# Patient Record
Sex: FEMALE | Race: BLACK OR AFRICAN AMERICAN | URBAN - METROPOLITAN AREA
[De-identification: names, ages, dates, MRNs, and addresses within clinical notes are randomized per-mention and may not be internally consistent; named-entity substitution may affect disease eponyms.]

---

## 2023-02-15 ENCOUNTER — OFFICE (OUTPATIENT)
Dept: URBAN - METROPOLITAN AREA CLINIC 35 | Facility: CLINIC | Age: 63
Setting detail: OPHTHALMOLOGY
End: 2023-02-15
Payer: COMMERCIAL

## 2023-02-15 DIAGNOSIS — D31.02: ICD-10-CM

## 2023-02-15 DIAGNOSIS — H25.13: ICD-10-CM

## 2023-02-15 DIAGNOSIS — H40.1131: ICD-10-CM

## 2023-02-15 DIAGNOSIS — D31.01: ICD-10-CM

## 2023-02-15 DIAGNOSIS — H02.831: ICD-10-CM

## 2023-02-15 PROCEDURE — 92012 INTRM OPH EXAM EST PATIENT: CPT | Performed by: OPHTHALMOLOGY

## 2023-02-15 PROCEDURE — 92083 EXTENDED VISUAL FIELD XM: CPT | Performed by: OPHTHALMOLOGY

## 2023-02-15 ASSESSMENT — REFRACTION_MANIFEST
OS_AXIS: 047
OD_VA1: 20/20-2
OD_CYLINDER: +1.00
OD_VA1: 20/40+1
OD_SPHERE: +0.75
OS_VA1: 20/30+2
OS_SPHERE: PLANO
OD_SPHERE: PLANO
OD_CYLINDER: 0.00
OS_VA1: 20/20-3
OD_AXIS: 000
OD_AXIS: 005
OS_SPHERE: -1.00
OS_CYLINDER: -1.25
OS_CYLINDER: SPHERE

## 2023-02-15 ASSESSMENT — REFRACTION_AUTOREFRACTION
OD_SPHERE: +1.00
OD_CYLINDER: -2.00
OS_SPHERE: -0.50
OS_CYLINDER: -1.25
OS_AXIS: 050
OD_AXIS: 093

## 2023-02-15 ASSESSMENT — LID POSITION - DERMATOCHALASIS
OD_DERMATOCHALASIS: RUL 1+
OS_DERMATOCHALASIS: LUL 1+

## 2023-02-15 ASSESSMENT — AXIALLENGTH_DERIVED
OD_AL: 25.003
OS_AL: 33.74
OD_AL: 24.6791

## 2023-02-15 ASSESSMENT — PACHYMETRY
OD_CT_CORRECTION: -5
OD_CT_UM: 617
OS_CT_UM: 618
OS_CT_CORRECTION: -5

## 2023-02-15 ASSESSMENT — VISUAL ACUITY
OD_BCVA: 20/30-2
OS_BCVA: 20/50-2

## 2023-02-15 ASSESSMENT — KERATOMETRY
OD_K1POWER_DIOPTERS: 39.50
OD_K2POWER_DIOPTERS: 40.25
OS_AXISANGLE_DEGREES: 134
OS_K1POWER_DIOPTERS: 40.00
OS_K2POWER_DIOPTERS: 10.75
OD_AXISANGLE_DEGREES: 171

## 2023-02-15 ASSESSMENT — TONOMETRY
OS_IOP_MMHG: 17
OD_IOP_MMHG: 17

## 2023-02-15 ASSESSMENT — LID EXAM ASSESSMENTS
OS_MEIBOMITIS: LLL 1+
OD_MEIBOMITIS: RLL 1+

## 2023-02-15 ASSESSMENT — SPHEQUIV_DERIVED
OD_SPHEQUIV: 0
OD_SPHEQUIV: 0.75
OS_SPHEQUIV: -1.125

## 2023-06-14 ENCOUNTER — OFFICE (OUTPATIENT)
Dept: URBAN - METROPOLITAN AREA CLINIC 35 | Facility: CLINIC | Age: 63
Setting detail: OPHTHALMOLOGY
End: 2023-06-14
Payer: COMMERCIAL

## 2023-06-14 DIAGNOSIS — H02.831: ICD-10-CM

## 2023-06-14 DIAGNOSIS — H25.13: ICD-10-CM

## 2023-06-14 DIAGNOSIS — E11.9: ICD-10-CM

## 2023-06-14 DIAGNOSIS — D31.02: ICD-10-CM

## 2023-06-14 DIAGNOSIS — D31.01: ICD-10-CM

## 2023-06-14 PROBLEM — H52.223 ASTIGMATISM, REGULAR; BOTH EYES: Status: ACTIVE | Noted: 2023-06-14

## 2023-06-14 PROCEDURE — 92014 COMPRE OPH EXAM EST PT 1/>: CPT | Performed by: OPHTHALMOLOGY

## 2023-06-14 PROCEDURE — 92250 FUNDUS PHOTOGRAPHY W/I&R: CPT | Performed by: OPHTHALMOLOGY

## 2023-06-14 ASSESSMENT — REFRACTION_MANIFEST
OS_CYLINDER: SPHERE
OS_VA1: 20/20-3
OS_SPHERE: PLANO
OD_SPHERE: +0.75
OS_VA1: 20/40
OD_CYLINDER: +1.00
OD_AXIS: 100
OD_VA1: 20/50
OD_CYLINDER: 0.00
OS_AXIS: 047
OD_AXIS: 000
OS_SPHERE: PLANO
OD_VA1: 20/20-2
OS_CYLINDER: -1.25
OD_SPHERE: +2.00
OD_CYLINDER: -2.50
OS_SPHERE: -1.00
OD_SPHERE: PLANO
OD_VA1: 20/40+1
OS_AXIS: 045
OS_VA1: 20/30+2
OD_AXIS: 005
OS_CYLINDER: -1.00

## 2023-06-14 ASSESSMENT — REFRACTION_AUTOREFRACTION
OS_SPHERE: 0.00
OS_CYLINDER: -1.00
OD_CYLINDER: -2.50
OD_AXIS: 099
OD_SPHERE: +2.00
OS_AXIS: 043

## 2023-06-14 ASSESSMENT — VISUAL ACUITY
OS_BCVA: 20/60
OD_BCVA: 20/40

## 2023-06-14 ASSESSMENT — LID EXAM ASSESSMENTS
OS_MEIBOMITIS: LLL 1+
OD_MEIBOMITIS: RLL 1+

## 2023-06-14 ASSESSMENT — AXIALLENGTH_DERIVED
OS_AL: 25.1192
OD_AL: 24.78

## 2023-06-14 ASSESSMENT — PACHYMETRY
OD_CT_CORRECTION: -5
OS_CT_UM: 618
OS_CT_CORRECTION: -5
OD_CT_UM: 617

## 2023-06-14 ASSESSMENT — KERATOMETRY
OS_K2POWER_DIOPTERS: 40.75
OS_AXISANGLE_DEGREES: 129
OD_K2POWER_DIOPTERS: 40.25
OD_K1POWER_DIOPTERS: 39.00
OS_K1POWER_DIOPTERS: 39.50
OD_AXISANGLE_DEGREES: 164

## 2023-06-14 ASSESSMENT — SPHEQUIV_DERIVED
OD_SPHEQUIV: 0.75
OS_SPHEQUIV: -0.5
OD_SPHEQUIV: 0.75
OD_SPHEQUIV: 0.75

## 2023-06-14 ASSESSMENT — CONFRONTATIONAL VISUAL FIELD TEST (CVF)
OS_FINDINGS: FULL
OD_FINDINGS: FULL

## 2023-06-14 ASSESSMENT — LID POSITION - DERMATOCHALASIS
OD_DERMATOCHALASIS: RUL 1+
OS_DERMATOCHALASIS: LUL 1+

## 2023-06-14 ASSESSMENT — TONOMETRY
OS_IOP_MMHG: 21
OD_IOP_MMHG: 20

## 2023-10-11 ENCOUNTER — OFFICE (OUTPATIENT)
Dept: URBAN - METROPOLITAN AREA CLINIC 35 | Facility: CLINIC | Age: 63
Setting detail: OPHTHALMOLOGY
End: 2023-10-11
Payer: COMMERCIAL

## 2023-10-11 DIAGNOSIS — H02.012: ICD-10-CM

## 2023-10-11 DIAGNOSIS — H25.13: ICD-10-CM

## 2023-10-11 DIAGNOSIS — H40.1131: ICD-10-CM

## 2023-10-11 PROBLEM — H53.001 AMBLYOPIA; RIGHT EYE: Status: ACTIVE | Noted: 2023-10-11

## 2023-10-11 PROBLEM — D31.01 NEVUS,CONJUNCTIVAL; RIGHT EYE, LEFT EYE: Status: ACTIVE | Noted: 2023-10-11

## 2023-10-11 PROBLEM — D31.02 NEVUS,CONJUNCTIVAL; RIGHT EYE, LEFT EYE: Status: ACTIVE | Noted: 2023-10-11

## 2023-10-11 PROCEDURE — 92133 CPTRZD OPH DX IMG PST SGM ON: CPT | Performed by: OPHTHALMOLOGY

## 2023-10-11 PROCEDURE — 99213 OFFICE O/P EST LOW 20 MIN: CPT | Mod: 25 | Performed by: OPHTHALMOLOGY

## 2023-10-11 PROCEDURE — 67820 REVISE EYELASHES: CPT | Mod: E4 | Performed by: OPHTHALMOLOGY

## 2023-10-11 ASSESSMENT — SPHEQUIV_DERIVED
OD_SPHEQUIV: 0.75
OD_SPHEQUIV: 1.25
OS_SPHEQUIV: 0
OD_SPHEQUIV: 1.125
OS_SPHEQUIV: 0
OD_SPHEQUIV: 0.75

## 2023-10-11 ASSESSMENT — REFRACTION_MANIFEST
OS_VA1: 20/40
OD_VA1: 20/40+1
OS_VA1: 20/30+2
OS_AXIS: 045
OD_CYLINDER: +1.00
OS_AXIS: 047
OS_SPHERE: PLANO
OD_AXIS: 085
OD_AXIS: 000
OD_AXIS: 100
OS_AXIS: 065
OD_VA1: 20/40-1
OS_SPHERE: PLANO
OS_CYLINDER: -1.50
OD_CYLINDER: -2.50
OS_CYLINDER: SPHERE
OD_SPHERE: +0.75
OS_SPHERE: -1.00
OS_CYLINDER: -1.25
OS_VA1: 20/30+
OD_SPHERE: +2.00
OD_VA1: 20/50
OD_CYLINDER: 0.00
OD_SPHERE: +2.50
OS_VA1: 20/20-3
OD_AXIS: 005
OD_CYLINDER: -2.50
OS_CYLINDER: -1.00
OD_VA1: 20/20-2
OS_SPHERE: +0.75
OD_SPHERE: PLANO

## 2023-10-11 ASSESSMENT — VISUAL ACUITY
OD_BCVA: 20/40+2
OS_BCVA: 20/100

## 2023-10-11 ASSESSMENT — REFRACTION_AUTOREFRACTION
OD_CYLINDER: -2.75
OD_AXIS: 089
OS_AXIS: 062
OS_CYLINDER: -1.50
OS_SPHERE: +0.75
OD_SPHERE: +2.50

## 2023-10-11 ASSESSMENT — TONOMETRY
OS_IOP_MMHG: 19
OD_IOP_MMHG: 19

## 2023-10-11 ASSESSMENT — PACHYMETRY
OD_CT_UM: 617
OS_CT_UM: 618
OD_CT_CORRECTION: -5
OS_CT_CORRECTION: -5

## 2023-10-11 ASSESSMENT — AXIALLENGTH_DERIVED
OD_AL: 24.78
OD_AL: 24.5669
OS_AL: 24.9516
OD_AL: 24.6198
OS_AL: 24.9516
OD_AL: 24.78

## 2023-10-11 ASSESSMENT — KERATOMETRY
OS_AXISANGLE_DEGREES: 135
OD_AXISANGLE_DEGREES: 167
OS_K2POWER_DIOPTERS: 40.50
OD_K2POWER_DIOPTERS: 40.25
OS_K1POWER_DIOPTERS: 39.50
OD_K1POWER_DIOPTERS: 39.00

## 2023-10-11 ASSESSMENT — LID POSITION - DERMATOCHALASIS
OD_DERMATOCHALASIS: RUL 1+
OS_DERMATOCHALASIS: LUL 1+

## 2023-10-11 ASSESSMENT — CONFRONTATIONAL VISUAL FIELD TEST (CVF)
OD_FINDINGS: FULL
OS_FINDINGS: FULL

## 2023-10-11 ASSESSMENT — LID EXAM ASSESSMENTS
OD_MEIBOMITIS: RLL 1+
OD_TRICHIASIS: RLL
OS_MEIBOMITIS: LLL 1+

## 2024-02-04 ENCOUNTER — EMERGENCY (EMERGENCY)
Facility: HOSPITAL | Age: 64
LOS: 1 days | Discharge: HOME | End: 2024-02-04
Attending: EMERGENCY MEDICINE | Admitting: EMERGENCY MEDICINE
Payer: COMMERCIAL

## 2024-02-04 VITALS
TEMPERATURE: 99 F | RESPIRATION RATE: 16 BRPM | OXYGEN SATURATION: 100 % | DIASTOLIC BLOOD PRESSURE: 94 MMHG | HEART RATE: 104 BPM | SYSTOLIC BLOOD PRESSURE: 163 MMHG

## 2024-02-04 PROCEDURE — 99284 EMERGENCY DEPT VISIT MOD MDM: CPT

## 2024-02-04 RX ORDER — LIDOCAINE 4 G/100G
2 CREAM TOPICAL ONCE
Refills: 0 | Status: COMPLETED | OUTPATIENT
Start: 2024-02-04 | End: 2024-02-04

## 2024-02-04 RX ORDER — KETOROLAC TROMETHAMINE 30 MG/ML
30 SYRINGE (ML) INJECTION ONCE
Refills: 0 | Status: DISCONTINUED | OUTPATIENT
Start: 2024-02-04 | End: 2024-02-04

## 2024-02-04 RX ORDER — LIDOCAINE 4 G/100G
1 CREAM TOPICAL
Qty: 7 | Refills: 1
Start: 2024-02-04 | End: 2024-02-17

## 2024-02-04 RX ORDER — CYCLOBENZAPRINE HYDROCHLORIDE 10 MG/1
10 TABLET, FILM COATED ORAL ONCE
Refills: 0 | Status: COMPLETED | OUTPATIENT
Start: 2024-02-04 | End: 2024-02-04

## 2024-02-04 RX ORDER — CYCLOBENZAPRINE HYDROCHLORIDE 10 MG/1
1 TABLET, FILM COATED ORAL
Qty: 21 | Refills: 0
Start: 2024-02-04 | End: 2024-02-10

## 2024-02-04 RX ADMIN — Medication 30 MILLIGRAM(S): at 12:20

## 2024-02-04 RX ADMIN — CYCLOBENZAPRINE HYDROCHLORIDE 10 MILLIGRAM(S): 10 TABLET, FILM COATED ORAL at 12:20

## 2024-02-04 RX ADMIN — LIDOCAINE 2 PATCH: 4 CREAM TOPICAL at 12:21

## 2024-02-04 NOTE — ED ADULT NURSE NOTE - OBJECTIVE STATEMENT
patient AOX4 came in c/o right hip lower back pain. denies injury,NAD. meds given as ordered. d/c by provider

## 2024-02-04 NOTE — ED PROVIDER NOTE - TOBACCO USE
Medication:   omeprazole (PriLOSEC) 40 MG capsule 90 capsule 1 6/22/2023 --    Sig - Route: Take 1 capsule by mouth daily. - Oral    Sent to pharmacy as: Omeprazole 40 MG Oral Capsule Delayed Release (PriLOSEC)    Class: Eprescribe    E-Prescribing Status: Receipt confirmed by pharmacy (6/22/2023  3:04 PM CDT)      yMedication refill denied due to early request.  
Never smoker

## 2024-02-04 NOTE — ED PROVIDER NOTE - MUSCULOSKELETAL, MLM
Urinalysis negative.  RVP positive for parainfluenza virus, likely source of infection and fever.  Patient is stable for discharge home with Tylenol and Motrin as needed  Caesar Schilling DO  Attending Physician  Pediatric Emergency Department
Spine appears normal no paraspinal tenderness; + tenderness over right ischial bursa rad to hamstring group worse with rt SLR;  range of motion is not limited however; , no muscle or joint tenderness

## 2024-02-04 NOTE — ED PROVIDER NOTE - OBJECTIVE STATEMENT
65
The patient is a 63y Female who has a past medical and surgery history of   HTN DM PTED c/o atraumatic R thigh pain starting yesterday. Denies back pain redness, swelling, fever, numbness, tingling. does describe intermittent gillian crampy Lower abdominal pain (sweeps with hands from back towads groin) describes as shooting/sharp also goes down right post leg. No incontinence saddle anesthesia focal motor weakness noted no vaginal bleeding d/c diarrhea constipation n/v or UTI s/s reported

## 2024-02-04 NOTE — ED PROVIDER NOTE - PATIENT PORTAL LINK FT
You can access the FollowMyHealth Patient Portal offered by [facility] by registering at the following website: [email or web address]. By joining [facility]’s FollowMyHealth portal, you will also be able to view your health information using other applications (apps) compatible with our system.

## 2024-02-04 NOTE — ED PROVIDER NOTE - CARE PROVIDER_API CALL
Marybeth Fernández  Internal Medicine  [address] [place] [address]  Phone: [phone]  Fax: [phone]  Established Patient  Follow Up Time: 4-6 Days

## 2024-02-04 NOTE — ED ADULT TRIAGE NOTE - CHIEF COMPLAINT QUOTE
Patient c/o atraumatic R thigh pain starting yesterday. Denies redness, swelling, fever, numbness, tingling. Phx HTN, DM2

## 2024-02-04 NOTE — ED PROVIDER NOTE - CLINICAL SUMMARY MEDICAL DECISION MAKING FREE TEXT BOX
The patient is a 63y Female who has a past medical and surgery history of   HTN DM PTED c/o atraumatic R thigh pain starting yesterday. Denies back pain redness, swelling, fever, numbness, tingling. does describe intermittent gillian crampy Lower abdominal pain (sweeps with hands from back towads groin) describes as shooting/sharp also goes down right post leg  no incontinence saddle anesthesia focal motor weakness noted. No incontinence saddle anesthesia focal motor weakness noted no vaginal bleeding d/c diarrhea constipation n/v or UTI s/s reported   Vital 163/94   RR 16 T 98.6 SPO2 100% R  PE: as described;    IMPRESSION/RISK:  Dx= Lumbar radiculopathy  Consideration include Although no back pain many of patients episodic back pain follows dermatomes (ilioinguinal N, sciatic) but especially tender over right ischium/bursa; this pain radiates to hamstring muscle group corresponding to patients leg pain No red flags suggesting need for emergent/urgent imaging  Plan  NSAIDS patch muscle relaxants spine referral for further w/u PT if no response  RTED PRN

## 2024-02-04 NOTE — ED PROVIDER NOTE - NSFOLLOWUPINSTRUCTIONS_ED_ALL_ED_FT
We've got you covered from head to toe    Our specialty programs:    [facility]  [phone]  [email or web address]    [facility]  [phone]  [email or web address]    Phillip (Scheduling) team hours of operation:  Monday through Thursday, 7am to 8:30pm  Friday, 7am to 7pm  Saturday, 8am to 4pm    [facility]  Orthopedic Surgery  [address] [place] [id number]  Phone: [phone]  Fax:   Follow Up Time:     [facility] Orth[facility]  Orthopedic Surgery  [address], 3rd & 4th [place] [address]  Phone: [phone]  Fax:   Follow Up Time:     [facility] [place]  Orthopedics  .  [place]   Phone: [phone]  Fax:   Follow Up Time:     [facility]  Orthopedics  [address] [place] [address]  Phone: [phone]  Fax: [phone]  Follow Up Time:     [facility]  Orthopedics  [address] [place] [address]  Phone: [phone]  Fax:   Follow Up Time:     Back Pain  WHAT YOU NEED TO KNOW:  Back pain is common. It can be caused by many conditions, such as arthritis or the breakdown of spinal discs. Your risk for back pain is increased by injuries, lack of activity, or repeated bending and twisting. You may feel sore or stiff on one or both sides of your back. The pain may spread to your buttocks or thighs.  DISCHARGE INSTRUCTIONS:  Return to the emergency department if:   •You have pain, numbness, or weakness in one or both legs.  •Your pain becomes so severe that you cannot walk.  •You cannot control your urine or bowel movements.  •You have severe back pain with chest pain.  •You have severe back pain, nausea, and vomiting.  •You have severe back pain that spreads to your side or genital area.  Contact your healthcare provider if:   •You have back pain that does not get better with rest and pain medicine.  •You have a fever.  •You have pain that worsens when you are on your back or when you rest.  •You have pain that worsens when you cough or sneeze.  •You lose weight without trying.  •You have questions or concerns about your condition or care.  Medicines:   •NSAIDs help decrease swelling and pain. This medicine is available with or without a doctor's order. NSAIDs can cause stomach bleeding or kidney problems in certain people. If you take blood thinner medicine, always ask your healthcare provider if NSAIDs are safe for you. Always read the medicine label and follow directions.  •Acetaminophen decreases pain and fever. It is available without a doctor's order. Ask how much to take and how often to take it. Follow directions. Read the labels of all other medicines you are using to see if they also contain acetaminophen, or ask your doctor or pharmacist. Acetaminophen can cause liver damage if not taken correctly. Do not use more than 4 grams (4,000 milligrams) total of acetaminophen in one day.   •Muscle relaxers help decrease muscle spasms and back pain.  •Prescription pain medicine may be given. Ask your healthcare provider how to take this medicine safely. Some prescription pain medicines contain acetaminophen. Do not take other medicines that contain acetaminophen without talking to your healthcare provider. Too much acetaminophen may cause liver damage. Prescription pain medicine may cause constipation. Ask your healthcare provider how to prevent or treat constipation.   •Take your medicine as directed. Contact your healthcare provider if you think your medicine is not helping or if you have side effects. Tell him or her if you are allergic to any medicine. Keep a list of the medicines, vitamins, and herbs you take. Include the amounts, and when and why you take them. Bring the list or the pill bottles to follow-up visits. Carry your medicine list with you in case of an emergency.  How to manage your back pain:   •Apply ice on your back for 15 to 20 minutes every hour or as directed. Use an ice pack, or put crushed ice in a plastic bag. Cover it with a towel before you apply it to your skin. Ice helps prevent tissue damage and decreases pain.  •Apply heat on your back for 20 to 30 minutes every 2 hours for as many days as directed. Heat helps decrease pain and muscle spasms.  •Stay active as much as you can without causing more pain. Bed rest could make your back pain worse. Avoid heavy lifting until your pain is gone.  •Go to physical therapy as directed. A physical therapist can teach you exercises to help improve movement and strength, and to decrease pain.  Follow up with your healthcare provider in 2 weeks, or as directed: Write down your questions so you remember to ask them during your visits.

## 2024-02-06 ENCOUNTER — NON-APPOINTMENT (OUTPATIENT)
Age: 64
End: 2024-02-06

## 2024-02-06 ENCOUNTER — APPOINTMENT (OUTPATIENT)
Age: 64
End: 2024-02-06
Payer: COMMERCIAL

## 2024-02-06 DIAGNOSIS — M25.551 PAIN IN RIGHT HIP: ICD-10-CM

## 2024-02-06 DIAGNOSIS — G89.29 PAIN IN RIGHT HIP: ICD-10-CM

## 2024-02-06 PROCEDURE — 99204 OFFICE O/P NEW MOD 45 MIN: CPT

## 2024-02-06 NOTE — HISTORY OF PRESENT ILLNESS
[FreeTextEntry1] : PADMINI BENAVIDES is an 63 year old F here for initial evaluation for lower back pain and lateral hip pain. She states she currently works as a . She states 3 days ago she started to have right lateral hip pain. She went to [facility] on sunday. She states she was given topical patches and toradol as well. she was also given naproxen and cyclobenzaprine. She states she has had axial low back pain prior which will occasionally radiate to the groin. No physical therapy yet.   Pain location: Pain in the buttocks that goes to the right lateral hip.  Quality: Sharp.  Radiation: Buttocks and to the lateral hip.  Severity: 1/10 and 10/10 when sevre.  Onset: 3 days ago.  Numbness: None  Weakness: None  Exacerbated by: when she put weight on the right leg and with initial step.  Improved by: unsure  Bowel or bladder involvement: None  Denies bowel/bladder dysfunction, saddle anesthesia, fevers, chills, weight loss, night pain, or night sweats at this time.  The pain interferes with function, ADLs and quality of life. Patient had imaging studies to evaluate the pain.

## 2024-02-06 NOTE — PHYSICAL EXAM
[FreeTextEntry1] : Constitutional: The patient appears well-developed, well-nourished, and in no apparent distress. Patient is well-groomed.     Skin: The skin is warm and dry, with normal turgor.   Eyes: PERRL.     ENMT: Ears: Hearing is grossly within normal limits.     Neck: Supple: The neck is supple.     Respiratory: Inspection: Breathing unlabored.     Neurologic: Alert and oriented x 3.    Psychiatric: Patient is cooperative and appropriate.  Mood and affect are normal.  Patient's insight is good, and memory and judgment are intact.  LUMBAR EXAM   APPEARANCE: No visible scars No gross deformity or malalignment No erythema, swelling or ecchymosis Normal lumbar lordosis No muscle atrophy of the left lower extremity No muscle atrophy of the right lower extremity   TENDERNESS: No trigger points over bilateral lumbar paraspinal muscles Absent over midline spinous processes +over left lumbar paraspinal muscles: erector spinae and quadratus lumborum +over right lumbar paraspinal muscles: erector spinae and quadratus lumborum Absent over left sacroiliac joint + right sacroiliac joint Non tender over the greater trochanter bilaterally.    ROM: Normal A/PROM of the lumbar spine No pain with flexion, extension of the lumbar spine No pain with left side bending No pain with right side bending No pain with left rotation No pain with right rotation   SPECIAL TESTS: Normal left straight leg raising test Normal right straight leg raising test FABERE left normal FABERE + on the right.   SENSORY TESTING: Intact to light touch Left L1-S2 Intact to light touch Right L1-S2   MOTOR TESTING: Muscle tone of the left lower extremity is normal Muscle tone of the right lower extremity is normal   Left hip flexion strength is 5/5 Right hip flexion strength is 5/5   Left quadriceps strength is 5/5 Right quadriceps strength is 5/5   Left ankle dorsiflexion strength is 5/5 Right ankle dorsiflexion strength is 5/5   Left ankle plantar flexion strength is 5/5 Right ankle plantar flexion strength is 5/5   REFLEXES: Patella (L4) left 2+ Patella (L4) right 2+   GAIT: Non-antalgic gait Balance normal with ambulation

## 2024-02-06 NOTE — ASSESSMENT
[FreeTextEntry1] : 63-year-old female with new onset of low back and lateral hip pain which started couple of days ago. The pain can be secondary to the SI Joint vs Hip osteoarthritis.    Patient reassured and educated on the diagnosis and treatment options. Risks and benefits of treatment and of delaying treatment discussed with patient. Risks discussed include but not limited to: progression of symptoms, worsening pain and functional status, etc.   This note was generated using Dragon medical dictation software. A reasonable effort had been made for proofreading its contents, but spelling mistakes or grammatical errors may still remain. If there are any questions or points of clarification needed please notify my office.   Patient was recommended to get xray of the lumbar and hip.  Patient was recommended to continue to use naproxen and cyclobenzaprine on a as needed basis for now.  Patient was recommended to start physical therapy to strengthen core and hip muscles.   Follow up in 1 month.    Patient was advised if the following symptoms develop: chills, fever, loss of bladder control, bowel incontinence or urinary retention, numbness/tingling or weakness is present in upper or lower extremities, to go to the nearest emergency room. This may be a new clinical condition not present at the time of the patient visit that may lead to paralysis and/or death. Patient advised if the above symptoms developed to also call the office immediately to inform us and to go to the nearest emergency room.

## 2024-02-07 ENCOUNTER — APPOINTMENT (OUTPATIENT)
Dept: RADIOLOGY | Facility: CLINIC | Age: 64
End: 2024-02-07
Payer: COMMERCIAL

## 2024-02-07 ENCOUNTER — RESULT REVIEW (OUTPATIENT)
Age: 64
End: 2024-02-07

## 2024-02-07 ENCOUNTER — OUTPATIENT (OUTPATIENT)
Dept: OUTPATIENT SERVICES | Facility: HOSPITAL | Age: 64
LOS: 1 days | End: 2024-02-07
Payer: COMMERCIAL

## 2024-02-07 DIAGNOSIS — M25.551 PAIN IN RIGHT HIP: ICD-10-CM

## 2024-02-07 DIAGNOSIS — M53.3 SACROCOCCYGEAL DISORDERS, NOT ELSEWHERE CLASSIFIED: ICD-10-CM

## 2024-02-07 PROBLEM — I10 ESSENTIAL (PRIMARY) HYPERTENSION: Chronic | Status: ACTIVE | Noted: 2024-02-06

## 2024-02-07 PROBLEM — E11.9 TYPE 2 DIABETES MELLITUS WITHOUT COMPLICATIONS: Chronic | Status: ACTIVE | Noted: 2024-02-06

## 2024-02-07 PROCEDURE — 73521 X-RAY EXAM HIPS BI 2 VIEWS: CPT | Mod: 26

## 2024-02-07 PROCEDURE — 72100 X-RAY EXAM L-S SPINE 2/3 VWS: CPT

## 2024-02-07 PROCEDURE — 73521 X-RAY EXAM HIPS BI 2 VIEWS: CPT

## 2024-02-07 PROCEDURE — 72100 X-RAY EXAM L-S SPINE 2/3 VWS: CPT | Mod: 26

## 2024-02-14 ENCOUNTER — OFFICE (OUTPATIENT)
Dept: URBAN - METROPOLITAN AREA CLINIC 35 | Facility: CLINIC | Age: 64
Setting detail: OPHTHALMOLOGY
End: 2024-02-14
Payer: COMMERCIAL

## 2024-02-14 DIAGNOSIS — H02.831: ICD-10-CM

## 2024-02-14 DIAGNOSIS — D31.02: ICD-10-CM

## 2024-02-14 DIAGNOSIS — H53.001: ICD-10-CM

## 2024-02-14 DIAGNOSIS — H17.9: ICD-10-CM

## 2024-02-14 DIAGNOSIS — H25.13: ICD-10-CM

## 2024-02-14 DIAGNOSIS — H02.834: ICD-10-CM

## 2024-02-14 DIAGNOSIS — D31.01: ICD-10-CM

## 2024-02-14 DIAGNOSIS — H40.1131: ICD-10-CM

## 2024-02-14 DIAGNOSIS — H02.012: ICD-10-CM

## 2024-02-14 PROCEDURE — 92012 INTRM OPH EXAM EST PATIENT: CPT | Performed by: OPHTHALMOLOGY

## 2024-02-14 PROCEDURE — 92083 EXTENDED VISUAL FIELD XM: CPT | Performed by: OPHTHALMOLOGY

## 2024-02-14 ASSESSMENT — LID EXAM ASSESSMENTS
OS_MEIBOMITIS: LLL 1+
OD_MEIBOMITIS: RLL 1+
OD_TRICHIASIS: RLL

## 2024-02-14 ASSESSMENT — SPHEQUIV_DERIVED
OD_SPHEQUIV: 1.125
OS_SPHEQUIV: 0
OS_SPHEQUIV: 0
OD_SPHEQUIV: 1.125
OS_SPHEQUIV: 0
OD_SPHEQUIV: 0.75
OD_SPHEQUIV: 1.25
OD_SPHEQUIV: 0.75
OS_SPHEQUIV: 0
OD_SPHEQUIV: 1.5

## 2024-02-14 ASSESSMENT — REFRACTION_MANIFEST
OS_CYLINDER: -1.00
OD_VA1: 20/20-2
OD_CYLINDER: +1.00
OD_CYLINDER: -2.50
OS_AXIS: 065
OS_VA1: 20/30+
OD_SPHERE: +2.00
OS_CYLINDER: -1.00
OS_AXIS: 050
OS_CYLINDER: -1.50
OS_VA1: 20/50
OD_SPHERE: PLANO
OS_VA1: 20/30+2
OS_SPHERE: +0.75
OD_SPHERE: +0.75
OS_SPHERE: +0.50
OD_VA1: 20/50
OD_AXIS: 000
OS_SPHERE: -1.00
OD_SPHERE: +2.50
OD_CYLINDER: -2.00
OD_AXIS: 075
OD_SPHERE: +2.50
OS_AXIS: 045
OD_VA1: 20/NI
OD_VA1: 20/40+1
OD_CYLINDER: -2.50
OS_AXIS: 050
OS_SPHERE: +0.50
OD_AXIS: 090
OD_AXIS: 085
OS_CYLINDER: -1.00
OS_SPHERE: PLANO
OS_VA1: 20/20-3
OS_VA1: 20/40
OD_VA1: 20/50+2
OS_VA1: 20/20-
OD_CYLINDER: 0.00
OS_CYLINDER: -1.25
OD_SPHERE: +2.50
OD_AXIS: 005
OD_VA1: 20/40-1
OD_AXIS: 100
OS_SPHERE: PLANO
OD_CYLINDER: -2.75
OS_AXIS: 047
OS_CYLINDER: SPHERE

## 2024-02-14 ASSESSMENT — REFRACTION_AUTOREFRACTION
OS_CYLINDER: -1.00
OD_SPHERE: +2.50
OD_CYLINDER: -2.75
OS_AXIS: 050
OD_AXIS: 091
OS_SPHERE: +0.50

## 2024-02-14 ASSESSMENT — CONFRONTATIONAL VISUAL FIELD TEST (CVF)
OD_FINDINGS: FULL
OS_FINDINGS: FULL

## 2024-02-14 ASSESSMENT — LID POSITION - DERMATOCHALASIS
OD_DERMATOCHALASIS: RUL 1+
OS_DERMATOCHALASIS: LUL 1+

## 2024-02-21 ENCOUNTER — TRANSCRIPTION ENCOUNTER (OUTPATIENT)
Age: 64
End: 2024-02-21

## 2024-03-10 NOTE — HISTORY OF PRESENT ILLNESS
[FreeTextEntry1] : PADMINI BENAVIDES is here for follow up. Since last visit   Denies any new pain, numbness or weakness, bowel/bladder dysfunction, saddle anesthesia, fevers, chills, weight loss, night pain, or night sweats at this time.

## 2024-03-11 ENCOUNTER — APPOINTMENT (OUTPATIENT)
Dept: PHYSICAL MEDICINE AND REHAB | Facility: CLINIC | Age: 64
End: 2024-03-11
Payer: COMMERCIAL

## 2024-03-12 ENCOUNTER — APPOINTMENT (OUTPATIENT)
Dept: PHYSICAL MEDICINE AND REHAB | Facility: CLINIC | Age: 64
End: 2024-03-12
Payer: COMMERCIAL

## 2024-03-12 PROCEDURE — 99213 OFFICE O/P EST LOW 20 MIN: CPT

## 2024-03-12 NOTE — ASSESSMENT
[FreeTextEntry1] : Ms. PADMINI BENAVIDES is a 63 year F with pain in the lower back on the RIGHT with radiation down the leg to the THIGH due to lumbar radiculopathy. She also has hip and SIJ pain, however these have been improving.  Patient reassured and educated on the diagnosis and treatment options. Risks and benefits of treatment and of delaying treatment discussed with patient. Risks discussed include but not limited to: progression of symptoms, worsening pain and functional status, etc.  This note was generated using Dragon medical dictation software. A reasonable effort had been made for proofreading its contents, but spelling mistakes or grammatical errors may still remain. If there are any questions or points of clarification needed please notify my office.  Xray of the lumbar and hip reviewed: narrowed L4L5 joint space Patient was recommended to continue to use naproxen and cyclobenzaprine on a as needed basis for now. Continue physical therapy to strengthen core and hip muscles.  Patient is being sent and I am requesting authorization for MRI w/o contrast of LUMBAR SPINE to evaluate for nerve compression, stenosis, degenerative disease, soft tissue injury or other new or worsening etiology of pain. Patient had tried and failed NSAIDs and home exercise program for at least 6 weeks. During next visit MRI report and/or imaging will be reviewed with patient.  Follow up in 1 month  Patient was advised if the following symptoms develop: chills, fever, loss of bladder control, bowel incontinence or urinary retention, numbness/tingling or weakness is present in upper or lower extremities, to go to the nearest emergency room. This may be a new clinical condition not present at the time of the patient visit that may lead to paralysis and/or death. Patient advised if the above symptoms developed to also call the office immediately to inform us and to go to the nearest emergency room.

## 2024-03-12 NOTE — HISTORY OF PRESENT ILLNESS
[FreeTextEntry1] : PADMINI BENAVIDES is here for follow up. Since last visit she has started and has been doing PT. She reports that it helped her pain over the lower back and hip. However she is noting persistent pain, tingling and burning sensation running down the RIGHT thigh and leg. It is over 5/10 on NRS at times. She had XR of the L spine and hip.   Denies any new pain, numbness or weakness, bowel/bladder dysfunction, saddle anesthesia, fevers, chills, weight loss, night pain, or night sweats at this time.

## 2024-04-13 ENCOUNTER — APPOINTMENT (OUTPATIENT)
Dept: MRI IMAGING | Facility: CLINIC | Age: 64
End: 2024-04-13
Payer: COMMERCIAL

## 2024-04-13 ENCOUNTER — OUTPATIENT (OUTPATIENT)
Dept: OUTPATIENT SERVICES | Facility: HOSPITAL | Age: 64
LOS: 1 days | End: 2024-04-13
Payer: COMMERCIAL

## 2024-04-13 DIAGNOSIS — M54.16 RADICULOPATHY, LUMBAR REGION: ICD-10-CM

## 2024-04-13 PROCEDURE — 72148 MRI LUMBAR SPINE W/O DYE: CPT | Mod: 26

## 2024-04-13 PROCEDURE — 72148 MRI LUMBAR SPINE W/O DYE: CPT

## 2024-05-08 ENCOUNTER — APPOINTMENT (OUTPATIENT)
Dept: PHYSICAL MEDICINE AND REHAB | Facility: CLINIC | Age: 64
End: 2024-05-08
Payer: COMMERCIAL

## 2024-05-08 DIAGNOSIS — M53.3 SACROCOCCYGEAL DISORDERS, NOT ELSEWHERE CLASSIFIED: ICD-10-CM

## 2024-05-08 DIAGNOSIS — M54.16 RADICULOPATHY, LUMBAR REGION: ICD-10-CM

## 2024-05-08 PROCEDURE — 99213 OFFICE O/P EST LOW 20 MIN: CPT

## 2024-05-08 NOTE — HISTORY OF PRESENT ILLNESS
[FreeTextEntry1] : PADMINI BENAVIDES is here for follow up. Since last visit she had gone for MRI L spine. She wishes to avoid and medications or injections at this time. Pain is much improved: over 50% better than before. It is over the RIGHT lower back and goes into the right thigh. She would like to attempt PT again.   Denies any new pain, numbness or weakness, bowel/bladder dysfunction, saddle anesthesia, fevers, chills, weight loss, night pain, or night sweats at this time.

## 2024-05-08 NOTE — ASSESSMENT
[FreeTextEntry1] : Ms. PADMINI BENAVIDES is a 63 year F with pain in the lower back on the RIGHT with radiation down the leg to the THIGH due to lumbar radiculopathy. She also has hip and SIJ pain, however these have been improving.  Patient reassured and educated on the diagnosis and treatment options. Risks and benefits of treatment and of delaying treatment discussed with patient. Risks discussed include but not limited to: progression of symptoms, worsening pain and functional status, etc.  This note was generated using Dragon medical dictation software. A reasonable effort had been made for proofreading its contents, but spelling mistakes or grammatical errors may still remain. If there are any questions or points of clarification needed please notify my office.  Patient was recommended to continue to use naproxen and cyclobenzaprine on a as needed basis for now.  MRI w/o contrast of LUMBAR SPINE imaging reviewed with patient in office today. Imaging and report demonstrate: endplate degenerative changes; DJD, spondylosis, spinal canal and foraminal narrowing b/l  Sending patient for PT for BACK PAIN to help relieve pain and improve function. Stretching, strengthening, ROM, home education and other appropriate interventions. Precautions include fall prevention.  Follow up in 2 months  Patient was advised if the following symptoms develop: chills, fever, loss of bladder control, bowel incontinence or urinary retention, numbness/tingling or weakness is present in upper or lower extremities, to go to the nearest emergency room. This may be a new clinical condition not present at the time of the patient visit that may lead to paralysis and/or death. Patient advised if the above symptoms developed to also call the office immediately to inform us and to go to the nearest emergency room.

## 2024-06-26 ENCOUNTER — OFFICE (OUTPATIENT)
Dept: URBAN - METROPOLITAN AREA CLINIC 35 | Facility: CLINIC | Age: 64
Setting detail: OPHTHALMOLOGY
End: 2024-06-26
Payer: COMMERCIAL

## 2024-06-26 DIAGNOSIS — H25.13: ICD-10-CM

## 2024-06-26 DIAGNOSIS — H02.012: ICD-10-CM

## 2024-06-26 DIAGNOSIS — E11.9: ICD-10-CM

## 2024-06-26 DIAGNOSIS — H02.831: ICD-10-CM

## 2024-06-26 DIAGNOSIS — D31.01: ICD-10-CM

## 2024-06-26 DIAGNOSIS — H53.001: ICD-10-CM

## 2024-06-26 DIAGNOSIS — H40.1131: ICD-10-CM

## 2024-06-26 DIAGNOSIS — H17.9: ICD-10-CM

## 2024-06-26 DIAGNOSIS — D31.02: ICD-10-CM

## 2024-06-26 DIAGNOSIS — H02.834: ICD-10-CM

## 2024-06-26 PROCEDURE — 92014 COMPRE OPH EXAM EST PT 1/>: CPT | Performed by: OPHTHALMOLOGY

## 2024-06-26 ASSESSMENT — LID EXAM ASSESSMENTS
OD_TRICHIASIS: RLL
OS_MEIBOMITIS: LLL 1+
OD_MEIBOMITIS: RLL 1+

## 2024-06-26 ASSESSMENT — CONFRONTATIONAL VISUAL FIELD TEST (CVF)
OS_FINDINGS: FULL
OD_FINDINGS: FULL

## 2024-06-26 ASSESSMENT — LID POSITION - DERMATOCHALASIS
OS_DERMATOCHALASIS: LUL 1+
OD_DERMATOCHALASIS: RUL 1+

## 2024-07-08 ENCOUNTER — APPOINTMENT (OUTPATIENT)
Dept: PHYSICAL MEDICINE AND REHAB | Facility: CLINIC | Age: 64
End: 2024-07-08

## 2024-10-09 ENCOUNTER — DOCTOR'S OFFICE (OUTPATIENT)
Facility: LOCATION | Age: 64
Setting detail: OPHTHALMOLOGY
End: 2024-10-09
Payer: COMMERCIAL

## 2024-10-09 DIAGNOSIS — H02.831: ICD-10-CM

## 2024-10-09 DIAGNOSIS — E11.9: ICD-10-CM

## 2024-10-09 DIAGNOSIS — H53.001: ICD-10-CM

## 2024-10-09 DIAGNOSIS — H40.1131: ICD-10-CM

## 2024-10-09 DIAGNOSIS — D31.01: ICD-10-CM

## 2024-10-09 DIAGNOSIS — D31.02: ICD-10-CM

## 2024-10-09 DIAGNOSIS — H25.13: ICD-10-CM

## 2024-10-09 DIAGNOSIS — H02.834: ICD-10-CM

## 2024-10-09 DIAGNOSIS — H17.9: ICD-10-CM

## 2024-10-09 DIAGNOSIS — H02.012: ICD-10-CM

## 2024-10-09 PROCEDURE — 99213 OFFICE O/P EST LOW 20 MIN: CPT | Performed by: OPHTHALMOLOGY

## 2024-10-09 PROCEDURE — 92133 CPTRZD OPH DX IMG PST SGM ON: CPT | Performed by: OPHTHALMOLOGY

## 2024-10-09 ASSESSMENT — REFRACTION_MANIFEST
OS_VA1: 20/40+2
OS_CYLINDER: -1.00
OS_SPHERE: +1.25
OS_SPHERE: +0.50
OD_AXIS: 100
OS_AXIS: 050
OS_CYLINDER: -1.50
OS_SPHERE: +0.50
OD_CYLINDER: +1.00
OD_VA1: 20/20-2
OS_SPHERE: +0.75
OS_CYLINDER: SPHERE
OD_CYLINDER: -2.50
OS_CYLINDER: -1.00
OS_AXIS: 050
OD_SPHERE: +2.00
OS_VA1: 20/30+2
OD_CYLINDER: -2.00
OD_SPHERE: PLANO
OD_AXIS: 085
OD_VA1: 20/60+1
OS_SPHERE: PLANO
OS_VA1: 20/20-
OS_AXIS: 045
OS_CYLINDER: -1.00
OD_SPHERE: +2.50
OD_SPHERE: +2.50
OS_CYLINDER: -1.00
OD_SPHERE: +2.50
OS_SPHERE: -1.00
OS_VA1: 20/20-3
OD_SPHERE: +2.75
OS_SPHERE: PLANO
OS_CYLINDER: -1.25
OD_AXIS: 095
OS_VA1: 20/30+
OS_VA1: 20/40
OS_AXIS: 060
OS_AXIS: 065
OS_AXIS: 047
OD_AXIS: 005
OD_VA1: 20/50
OD_AXIS: 075
OD_AXIS: 090
OD_AXIS: 000
OD_CYLINDER: -2.50
OD_SPHERE: +0.75
OD_CYLINDER: -2.75
OD_VA1: 20/40-1
OD_CYLINDER: 0.00
OD_VA1: 20/50+2
OS_VA1: 20/50
OD_VA1: 20/NI
OD_CYLINDER: -2.50
OD_VA1: 20/40+1

## 2024-10-09 ASSESSMENT — PACHYMETRY
OD_CT_CORRECTION: -5
OD_CT_UM: 617
OS_CT_CORRECTION: -5
OS_CT_UM: 618

## 2024-10-09 ASSESSMENT — TONOMETRY
OS_IOP_MMHG: 19
OD_IOP_MMHG: 20

## 2024-10-09 ASSESSMENT — REFRACTION_AUTOREFRACTION
OD_AXIS: 096
OS_CYLINDER: -1.25
OD_SPHERE: +2.50
OS_AXIS: 058
OD_CYLINDER: -2.50
OS_SPHERE: +1.00

## 2024-10-09 ASSESSMENT — LID POSITION - DERMATOCHALASIS
OS_DERMATOCHALASIS: LUL 1+
OD_DERMATOCHALASIS: RUL 1+

## 2024-10-09 ASSESSMENT — KERATOMETRY
OD_K2POWER_DIOPTERS: 40.00
OD_AXISANGLE_DEGREES: 170
OS_K2POWER_DIOPTERS: 46.50
OS_AXISANGLE_DEGREES: 096
OD_K1POWER_DIOPTERS: 39.00
OS_K1POWER_DIOPTERS: 45.25

## 2024-10-09 ASSESSMENT — VISUAL ACUITY
OD_BCVA: 20/30
OS_BCVA: 20/40

## 2024-10-09 ASSESSMENT — LID EXAM ASSESSMENTS
OD_MEIBOMITIS: RLL 1+
OD_TRICHIASIS: RLL
OS_MEIBOMITIS: LLL 1+

## 2024-10-09 ASSESSMENT — CONFRONTATIONAL VISUAL FIELD TEST (CVF)
OD_FINDINGS: FULL
OS_FINDINGS: FULL

## 2025-01-08 ENCOUNTER — DOCTOR'S OFFICE (OUTPATIENT)
Facility: LOCATION | Age: 65
Setting detail: OPHTHALMOLOGY
End: 2025-01-08
Payer: COMMERCIAL

## 2025-01-08 DIAGNOSIS — H53.001: ICD-10-CM

## 2025-01-08 DIAGNOSIS — D31.02: ICD-10-CM

## 2025-01-08 DIAGNOSIS — H02.834: ICD-10-CM

## 2025-01-08 DIAGNOSIS — H25.13: ICD-10-CM

## 2025-01-08 DIAGNOSIS — H17.9: ICD-10-CM

## 2025-01-08 DIAGNOSIS — H02.831: ICD-10-CM

## 2025-01-08 DIAGNOSIS — E11.9: ICD-10-CM

## 2025-01-08 DIAGNOSIS — D31.01: ICD-10-CM

## 2025-01-08 DIAGNOSIS — H40.1131: ICD-10-CM

## 2025-01-08 PROCEDURE — 99213 OFFICE O/P EST LOW 20 MIN: CPT | Performed by: OPHTHALMOLOGY

## 2025-01-08 PROCEDURE — 92083 EXTENDED VISUAL FIELD XM: CPT | Performed by: OPHTHALMOLOGY

## 2025-01-08 ASSESSMENT — REFRACTION_MANIFEST
OS_VA1: 20/40+2
OS_AXIS: 045
OD_SPHERE: +2.50
OD_CYLINDER: -2.50
OD_AXIS: 090
OS_CYLINDER: -1.50
OD_AXIS: 005
OD_VA1: 20/50
OS_SPHERE: PLANO
OD_AXIS: 100
OD_VA1: 20/20-2
OS_SPHERE: +1.25
OS_SPHERE: +0.50
OD_AXIS: 000
OD_AXIS: 075
OD_VA1: 20/60+1
OS_VA1: 20/20-3
OS_SPHERE: -1.00
OS_SPHERE: PLANO
OS_AXIS: 047
OD_SPHERE: +0.75
OS_AXIS: 060
OS_VA1: 20/30+2
OS_CYLINDER: -1.00
OS_VA1: 20/30+
OD_AXIS: 085
OD_AXIS: 095
OD_VA1: 20/40-1
OS_VA1: 20/20-
OS_AXIS: 050
OD_CYLINDER: -2.50
OD_SPHERE: +2.00
OD_SPHERE: +2.50
OS_SPHERE: +0.50
OD_VA1: 20/50+2
OD_CYLINDER: -2.50
OS_CYLINDER: -1.00
OS_CYLINDER: -1.00
OD_CYLINDER: +1.00
OD_CYLINDER: -2.75
OD_VA1: 20/NI
OS_CYLINDER: -1.25
OS_SPHERE: +0.75
OS_VA1: 20/40
OD_CYLINDER: -2.00
OD_SPHERE: PLANO
OS_AXIS: 065
OD_CYLINDER: 0.00
OD_VA1: 20/40+1
OD_SPHERE: +2.50
OS_CYLINDER: -1.00
OS_AXIS: 050
OS_CYLINDER: SPHERE
OS_VA1: 20/50
OD_SPHERE: +2.75

## 2025-01-08 ASSESSMENT — LID EXAM ASSESSMENTS
OD_TRICHIASIS: RLL
OD_MEIBOMITIS: RLL 1+
OS_MEIBOMITIS: LLL 1+

## 2025-01-08 ASSESSMENT — KERATOMETRY
OD_K2POWER_DIOPTERS: 40.00
OS_K1POWER_DIOPTERS: 39.25
OD_AXISANGLE_DEGREES: 159
OD_K1POWER_DIOPTERS: 39.00
OS_AXISANGLE_DEGREES: 143
OS_K2POWER_DIOPTERS: 40.25

## 2025-01-08 ASSESSMENT — TONOMETRY
OS_IOP_MMHG: 15
OD_IOP_MMHG: 15

## 2025-01-08 ASSESSMENT — LID POSITION - DERMATOCHALASIS
OS_DERMATOCHALASIS: LUL 1+
OD_DERMATOCHALASIS: RUL 1+

## 2025-01-08 ASSESSMENT — VISUAL ACUITY
OD_BCVA: 20/40+1
OS_BCVA: 20/50

## 2025-01-08 ASSESSMENT — REFRACTION_AUTOREFRACTION
OS_AXIS: 058
OS_CYLINDER: -1.25
OS_SPHERE: +0.50
OD_AXIS: 105
OD_SPHERE: +2.25
OD_CYLINDER: -2.25

## 2025-01-08 ASSESSMENT — PACHYMETRY
OD_CT_CORRECTION: -5
OS_CT_UM: 618
OS_CT_CORRECTION: -5
OD_CT_UM: 617

## 2025-01-08 ASSESSMENT — CONFRONTATIONAL VISUAL FIELD TEST (CVF)
OS_FINDINGS: FULL
OD_FINDINGS: FULL

## 2025-05-14 ENCOUNTER — DOCTOR'S OFFICE (OUTPATIENT)
Facility: LOCATION | Age: 65
Setting detail: OPHTHALMOLOGY
End: 2025-05-14
Payer: COMMERCIAL

## 2025-05-14 DIAGNOSIS — D31.02: ICD-10-CM

## 2025-05-14 DIAGNOSIS — H25.13: ICD-10-CM

## 2025-05-14 DIAGNOSIS — H40.1131: ICD-10-CM

## 2025-05-14 DIAGNOSIS — D31.01: ICD-10-CM

## 2025-05-14 PROCEDURE — 99213 OFFICE O/P EST LOW 20 MIN: CPT | Performed by: OPHTHALMOLOGY

## 2025-05-14 ASSESSMENT — REFRACTION_MANIFEST
OD_AXIS: 000
OS_AXIS: 047
OD_VA1: 20/50
OD_SPHERE: +2.50
OD_SPHERE: +2.50
OD_AXIS: 005
OS_VA1: 20/20-3
OS_SPHERE: +0.50
OD_AXIS: 085
OD_SPHERE: +2.75
OS_AXIS: 050
OS_CYLINDER: -1.00
OS_VA1: 20/40+2
OS_SPHERE: PLANO
OS_SPHERE: +0.75
OS_SPHERE: +0.50
OS_VA1: 20/20-
OD_VA1: 20/50+2
OS_VA1: 20/30+2
OD_VA1: 20/40+1
OD_VA1: 20/40-1
OS_SPHERE: +1.25
OD_VA1: 20/60+1
OS_CYLINDER: -1.00
OS_AXIS: 045
OD_AXIS: 075
OS_AXIS: 050
OS_CYLINDER: -1.25
OD_SPHERE: +2.50
OS_CYLINDER: SPHERE
OD_AXIS: 100
OD_VA1: 20/NI
OD_AXIS: 090
OS_SPHERE: -1.00
OD_SPHERE: +0.75
OD_CYLINDER: -2.50
OD_SPHERE: +2.00
OS_CYLINDER: -1.50
OS_VA1: 20/40
OD_CYLINDER: -2.00
OD_VA1: 20/20-2
OS_VA1: 20/30+
OS_CYLINDER: -1.00
OD_SPHERE: PLANO
OD_CYLINDER: 0.00
OD_CYLINDER: -2.50
OS_VA1: 20/50
OD_CYLINDER: +1.00
OS_CYLINDER: -1.00
OS_SPHERE: PLANO
OD_CYLINDER: -2.50
OS_AXIS: 060
OS_AXIS: 065
OD_AXIS: 095
OD_CYLINDER: -2.75

## 2025-05-14 ASSESSMENT — KERATOMETRY
OD_K2POWER_DIOPTERS: 40.00
OS_K1POWER_DIOPTERS: 39.50
OS_AXISANGLE_DEGREES: 141
OD_K1POWER_DIOPTERS: 39.00
OS_K2POWER_DIOPTERS: 40.50
OD_AXISANGLE_DEGREES: 161

## 2025-05-14 ASSESSMENT — TONOMETRY
OD_IOP_MMHG: 19
OS_IOP_MMHG: 19

## 2025-05-14 ASSESSMENT — PACHYMETRY
OD_CT_CORRECTION: -5
OD_CT_UM: 617
OS_CT_CORRECTION: -5
OS_CT_UM: 618

## 2025-05-14 ASSESSMENT — LID EXAM ASSESSMENTS
OD_TRICHIASIS: RLL
OS_MEIBOMITIS: LLL 1+
OD_MEIBOMITIS: RLL 1+

## 2025-05-14 ASSESSMENT — REFRACTION_AUTOREFRACTION
OD_AXIS: 097
OD_SPHERE: +2.50
OS_SPHERE: +1.25
OS_CYLINDER: -1.75
OD_CYLINDER: -2.50
OS_AXIS: 064

## 2025-05-14 ASSESSMENT — VISUAL ACUITY
OD_BCVA: 20/30-2
OS_BCVA: 20/50

## 2025-05-14 ASSESSMENT — LID POSITION - DERMATOCHALASIS
OS_DERMATOCHALASIS: LUL 1+
OD_DERMATOCHALASIS: RUL 1+

## 2025-05-14 ASSESSMENT — CONFRONTATIONAL VISUAL FIELD TEST (CVF)
OS_FINDINGS: FULL
OD_FINDINGS: FULL